# Patient Record
Sex: MALE | Race: WHITE | URBAN - METROPOLITAN AREA
[De-identification: names, ages, dates, MRNs, and addresses within clinical notes are randomized per-mention and may not be internally consistent; named-entity substitution may affect disease eponyms.]

---

## 2024-08-28 ENCOUNTER — LAB (OUTPATIENT)
Dept: LAB | Facility: LAB | Age: 79
End: 2024-08-28
Payer: MEDICARE

## 2024-08-28 ENCOUNTER — APPOINTMENT (OUTPATIENT)
Dept: PRIMARY CARE | Facility: CLINIC | Age: 79
End: 2024-08-28
Payer: MEDICARE

## 2024-08-28 VITALS
WEIGHT: 117.4 LBS | OXYGEN SATURATION: 98 % | DIASTOLIC BLOOD PRESSURE: 72 MMHG | SYSTOLIC BLOOD PRESSURE: 120 MMHG | HEART RATE: 73 BPM

## 2024-08-28 DIAGNOSIS — Z00.00 MEDICARE ANNUAL WELLNESS VISIT, INITIAL: Primary | Chronic | ICD-10-CM

## 2024-08-28 DIAGNOSIS — D64.9 ANEMIA, UNSPECIFIED TYPE: ICD-10-CM

## 2024-08-28 DIAGNOSIS — Z00.00 MEDICARE ANNUAL WELLNESS VISIT, INITIAL: Chronic | ICD-10-CM

## 2024-08-28 DIAGNOSIS — Z13.29 SCREENING FOR THYROID DISORDER: ICD-10-CM

## 2024-08-28 DIAGNOSIS — R19.7 DIARRHEA, UNSPECIFIED TYPE: ICD-10-CM

## 2024-08-28 LAB
ALBUMIN SERPL BCP-MCNC: 4.6 G/DL (ref 3.4–5)
ALP SERPL-CCNC: 66 U/L (ref 33–136)
ALT SERPL W P-5'-P-CCNC: 27 U/L (ref 10–52)
ANION GAP SERPL CALC-SCNC: 14 MMOL/L (ref 10–20)
AST SERPL W P-5'-P-CCNC: 27 U/L (ref 9–39)
BASOPHILS # BLD AUTO: 0.03 X10*3/UL (ref 0–0.1)
BASOPHILS NFR BLD AUTO: 0.7 %
BILIRUB SERPL-MCNC: 1.1 MG/DL (ref 0–1.2)
BUN SERPL-MCNC: 11 MG/DL (ref 6–23)
CALCIUM SERPL-MCNC: 9.7 MG/DL (ref 8.6–10.6)
CHLORIDE SERPL-SCNC: 100 MMOL/L (ref 98–107)
CHOLEST SERPL-MCNC: 200 MG/DL (ref 0–199)
CHOLESTEROL/HDL RATIO: 2.2
CO2 SERPL-SCNC: 30 MMOL/L (ref 21–32)
CREAT SERPL-MCNC: 0.77 MG/DL (ref 0.5–1.3)
EGFRCR SERPLBLD CKD-EPI 2021: >90 ML/MIN/1.73M*2
EOSINOPHIL # BLD AUTO: 0.07 X10*3/UL (ref 0–0.4)
EOSINOPHIL NFR BLD AUTO: 1.6 %
ERYTHROCYTE [DISTWIDTH] IN BLOOD BY AUTOMATED COUNT: 12.2 % (ref 11.5–14.5)
GLIADIN PEPTIDE IGA SER IA-ACNC: 2.1 U/ML
GLUCOSE SERPL-MCNC: 84 MG/DL (ref 74–99)
HCT VFR BLD AUTO: 46 % (ref 41–52)
HDLC SERPL-MCNC: 89.5 MG/DL
HGB BLD-MCNC: 14.8 G/DL (ref 13.5–17.5)
IMM GRANULOCYTES # BLD AUTO: 0.01 X10*3/UL (ref 0–0.5)
IMM GRANULOCYTES NFR BLD AUTO: 0.2 % (ref 0–0.9)
LDLC SERPL CALC-MCNC: 97 MG/DL
LYMPHOCYTES # BLD AUTO: 1.33 X10*3/UL (ref 0.8–3)
LYMPHOCYTES NFR BLD AUTO: 30.1 %
MCH RBC QN AUTO: 32.5 PG (ref 26–34)
MCHC RBC AUTO-ENTMCNC: 32.2 G/DL (ref 32–36)
MCV RBC AUTO: 101 FL (ref 80–100)
MONOCYTES # BLD AUTO: 0.53 X10*3/UL (ref 0.05–0.8)
MONOCYTES NFR BLD AUTO: 12 %
NEUTROPHILS # BLD AUTO: 2.45 X10*3/UL (ref 1.6–5.5)
NEUTROPHILS NFR BLD AUTO: 55.4 %
NON HDL CHOLESTEROL: 111 MG/DL (ref 0–149)
NRBC BLD-RTO: 0 /100 WBCS (ref 0–0)
PLATELET # BLD AUTO: 265 X10*3/UL (ref 150–450)
POTASSIUM SERPL-SCNC: 3.8 MMOL/L (ref 3.5–5.3)
PROT SERPL-MCNC: 7.4 G/DL (ref 6.4–8.2)
PSA SERPL-MCNC: 0.54 NG/ML
RBC # BLD AUTO: 4.55 X10*6/UL (ref 4.5–5.9)
SODIUM SERPL-SCNC: 140 MMOL/L (ref 136–145)
T4 FREE SERPL-MCNC: 1.06 NG/DL (ref 0.78–1.48)
TRIGL SERPL-MCNC: 69 MG/DL (ref 0–149)
TSH SERPL-ACNC: 4.07 MIU/L (ref 0.44–3.98)
TTG IGA SER IA-ACNC: <1 U/ML
VLDL: 14 MG/DL (ref 0–40)
WBC # BLD AUTO: 4.4 X10*3/UL (ref 4.4–11.3)

## 2024-08-28 PROCEDURE — 93000 ELECTROCARDIOGRAM COMPLETE: CPT | Performed by: INTERNAL MEDICINE

## 2024-08-28 PROCEDURE — 83516 IMMUNOASSAY NONANTIBODY: CPT

## 2024-08-28 PROCEDURE — 1036F TOBACCO NON-USER: CPT | Performed by: INTERNAL MEDICINE

## 2024-08-28 PROCEDURE — 1158F ADVNC CARE PLAN TLK DOCD: CPT | Performed by: INTERNAL MEDICINE

## 2024-08-28 PROCEDURE — 84153 ASSAY OF PSA TOTAL: CPT

## 2024-08-28 PROCEDURE — 80061 LIPID PANEL: CPT

## 2024-08-28 PROCEDURE — 84439 ASSAY OF FREE THYROXINE: CPT

## 2024-08-28 PROCEDURE — 1170F FXNL STATUS ASSESSED: CPT | Performed by: INTERNAL MEDICINE

## 2024-08-28 PROCEDURE — 80053 COMPREHEN METABOLIC PANEL: CPT

## 2024-08-28 PROCEDURE — 1160F RVW MEDS BY RX/DR IN RCRD: CPT | Performed by: INTERNAL MEDICINE

## 2024-08-28 PROCEDURE — 36415 COLL VENOUS BLD VENIPUNCTURE: CPT

## 2024-08-28 PROCEDURE — 85025 COMPLETE CBC W/AUTO DIFF WBC: CPT

## 2024-08-28 PROCEDURE — 1123F ACP DISCUSS/DSCN MKR DOCD: CPT | Performed by: INTERNAL MEDICINE

## 2024-08-28 PROCEDURE — 1159F MED LIST DOCD IN RCRD: CPT | Performed by: INTERNAL MEDICINE

## 2024-08-28 PROCEDURE — 84443 ASSAY THYROID STIM HORMONE: CPT

## 2024-08-28 PROCEDURE — G0438 PPPS, INITIAL VISIT: HCPCS | Performed by: INTERNAL MEDICINE

## 2024-08-28 ASSESSMENT — ACTIVITIES OF DAILY LIVING (ADL)
MANAGING_FINANCES: INDEPENDENT
BATHING: INDEPENDENT
DRESSING: INDEPENDENT
GROCERY_SHOPPING: INDEPENDENT
DOING_HOUSEWORK: INDEPENDENT
BATHING: INDEPENDENT
DRESSING: INDEPENDENT
TAKING_MEDICATION: INDEPENDENT

## 2024-08-28 ASSESSMENT — PATIENT HEALTH QUESTIONNAIRE - PHQ9
2. FEELING DOWN, DEPRESSED OR HOPELESS: NOT AT ALL
1. LITTLE INTEREST OR PLEASURE IN DOING THINGS: NOT AT ALL
SUM OF ALL RESPONSES TO PHQ9 QUESTIONS 1 AND 2: 0

## 2024-08-28 ASSESSMENT — ENCOUNTER SYMPTOMS: CONSTITUTIONAL NEGATIVE: 1

## 2024-08-28 NOTE — PROGRESS NOTES
"Patient ID: Corey Clifton is a 79 y.o. male who presents for New Patient Visit and Medicare Annual Wellness Visit Initial.    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    All other systems reviewed and are negative.      Objective     Physical Exam  Vitals and nursing note reviewed.   Neck:      Vascular: No carotid bruit.   Cardiovascular:      Rate and Rhythm: Normal rate and regular rhythm.      Pulses: Normal pulses.      Heart sounds: Normal heart sounds. No murmur heard.  Pulmonary:      Effort: Pulmonary effort is normal.      Breath sounds: Normal breath sounds.   Abdominal:      General: Abdomen is flat. Bowel sounds are normal.      Palpations: Abdomen is soft. There is no mass.      Tenderness: There is no abdominal tenderness. There is no guarding or rebound.      Hernia: No hernia is present.   Musculoskeletal:      Right lower leg: No edema.      Left lower leg: No edema.   Skin:     Capillary Refill: Capillary refill takes more than 3 seconds.   Neurological:      General: No focal deficit present.      Mental Status: He is oriented to person, place, and time. Mental status is at baseline.   Psychiatric:         Mood and Affect: Mood normal.         Behavior: Behavior normal.         Thought Content: Thought content normal.         Judgment: Judgment normal.       No results found for: \"WBC\", \"HGB\", \"HCT\", \"MCV\", \"PLT\"        Problem List Items Addressed This Visit    None  Visit Diagnoses       Medicare annual wellness visit, " initial  (Chronic)   -  Primary    Relevant Orders    Comprehensive metabolic panel    Lipid panel    Prostate Spec.Ag,Screen    Anemia, unspecified type        Relevant Orders    CBC and Auto Differential             A/P         CBC,CMP,LIPID,PSA  CELIAC PANEL , TSH           Advance Care Planning Note     Discussion Date: 08/28/24   Discussion Participants: patient    The patient wishes to discuss Advance Care Planning today and the following is a brief summary of our discussion.     Patient has capacity to make their own medical decisions: Yes  Health Care Agent/Surrogate Decision Maker documented in chart: Yes    Documents on file and valid:  Advance Directive/Living Will: No   Health Care Power of : No  Other:     Communication of Medical Status/Prognosis:        Communication of Treatment Goals/Options:       Discussed with the patient end-of-life choices patient is presently full code he does not have a living will or healthcare power of  he will think about getting it done when ready will bring it on next office visit so that it can be scanned into the chart for the purpose of documentation    Treatment Decisions  Goals of Care: survival is prioritized, if goals for quality or survival can reasonably be achieved       Follow Up Plan    DISCUSSED       Team Members    Time Statement: Total face to face time spent on advance care planning was 5 minutes with 5 minutes spent in counseling, including the explanation.    Deric Lazcano MD  8/28/2024 8:12 AM

## 2024-08-30 LAB
GLIADIN PEPTIDE IGG SER IA-ACNC: <0.56 FLU (ref 0–4.99)
TTG IGG SER IA-ACNC: <0.82 FLU (ref 0–4.99)